# Patient Record
Sex: MALE | Race: WHITE | Employment: FULL TIME | URBAN - METROPOLITAN AREA
[De-identification: names, ages, dates, MRNs, and addresses within clinical notes are randomized per-mention and may not be internally consistent; named-entity substitution may affect disease eponyms.]

---
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
## 2023-01-03 ENCOUNTER — APPOINTMENT (OUTPATIENT)
Dept: PSYCHIATRY | Age: 18
End: 2023-01-03
Payer: COMMERCIAL

## 2023-01-03 ENCOUNTER — HOSPITAL ENCOUNTER (OUTPATIENT)
Dept: PSYCHIATRY | Age: 18
Setting detail: THERAPIES SERIES
Discharge: HOME | End: 2023-01-03
Payer: COMMERCIAL

## 2023-01-04 ENCOUNTER — APPOINTMENT (OUTPATIENT)
Dept: PSYCHIATRY | Age: 18
End: 2023-01-04
Payer: COMMERCIAL

## 2023-01-04 ENCOUNTER — HOSPITAL ENCOUNTER (OUTPATIENT)
Dept: PSYCHIATRY | Age: 18
Setting detail: THERAPIES SERIES
Discharge: HOME | End: 2023-01-04
Payer: COMMERCIAL

## 2023-01-04 PROCEDURE — 90834 PSYTX W PT 45 MINUTES: CPT

## 2023-01-04 NOTE — PROGRESS NOTES
[facility] TREATMENT PLAN      Location: [x] [place] [] Eyal Dunn    Treatment plan: Initial    Strengths: Hard worker, caring    Weakness/Limitations: impulsive     Service/Frequency/Duration: Individual 1 time a week for 90 days, Group assigned 1 time a week for 90 days, Urinalysis Random for 90 days, and Case Management as needed for 90 days    Diagnosis: F12.10 Nondependent cannabis abuse-unspecified use    Level of Care: 1 Outpatient Services     Problem: Substance use resulting in getting a marijuana STEPHANIE      Goal: Enhance personalized knowledge and insight associated with mood altering substances in 90 days   Objectives:   1) Remind self of 3-5 detrimental consequences in major life areas regarding substance use in 90 days Evaluation Date: 12/14/2022 Code: Achieved 9/21/2022 Client reports marijuana affected work due to making him lazy, he is on probation, he spent money, and his parents were disappointed in him due to getting the [employer]. 2) Identify 4 to 8 benefits and gratitude's due to remaining substance free in 90 days: Evaluation Date: 12/14/2022 Code: Achieved 12/7/2022 I am grateful for my daughter being healthy, my fiance' and my relationship, and for our home. 3) Identify 4 relapse triggers, define relapse, difference between internal and external triggers associated with substance use in 90 days and Evaluation Date: 12/14/2022 Code: Achieved 10/19/2022 Client reports when he first stopped smoking marijuana people and frustration were his biggest triggers. Now he denies having any triggers.       2.    Problem: Low Self-Esteem/Identify issues as a result of self-medicating     Goal: Will learn to focus on character strengths and feel better about himself in 90 days  Objectives:   1) Client will journal 3-5 times weekly thoughts and feelings and share in his individual sessions in 90 days:  Evaluation Date: 1/14/2023 Code: [address] 1/4/2023   2) Client will explore 3 new outdoor/indoor activities that bring him quentin in 90 days Evaluation Date:1/14/2023 Code: [address] 1/4/2023    3)  Utilize, if needed case management services provided by [facility] to enhance abstaining from substance use in 90 days Evaluation Date: 12/14/2022 Code: Discontinue 11/16/2022        3. Problem: History of Relapse due to lack of sober support. Goal: Understand that continuing to associate with the current peer group increases the risk of relapse in 90 days         Objectives:   1)  Identify and address 4 to 8 peers he should set boundaries with to avoid substance use in 90 days Evaluation Date: 12/14/2022 Code: Achieved 11/9/2022 Client has set clear boundaries with his family about not smoking around his child/baby. He reports his friends don't come around anymore     2) Enhance 4 to 8 healthy techniques and coping skills, relapse prevention in 90 days Evaluation Date: 12/14/2022 Code: Achieved 11/30/2022 Client reports he works full time, plays video games, plays with his dogs and plays basketball. 3) Identify 5 times when peer group negativity led to addictive behavior in 90 days Evaluation Date: 12/14/2022 Code Achieved 11/9/2022 Client denies peer group influenced him at all. If he wanted to smoke weed he would. 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 Discharge Plan/Instructions: Client will remain abstinent from all mood altering substances and complete his treatment plan goals and objectives. Abraham Stokes / 2005 has participated in the treatment plan development outlined above on 1/4/2023.      MARTIN David  1/4/2023/2:20 PM

## 2023-01-04 NOTE — PROGRESS NOTES
612 [facility]        Individual  Progress Note    Location: [x] [place] [] Shayy kelly                   Patient Name: Dave Mckee   : 2005     Case # :  [id number]  Therapist: MARTIN Peters        Objective/Service/Time: kept 1 hour individual     S-Client is a 21year old  male on probation. Client reports he is using his medical marijuana daily. He shared he has not been working much and his background check came in and he is planning on starting at [address] on the . Client denies any current obstacles or stressors. O-Client was pleasant, cooperative and oriented x 4. A-Client has good insight into his AoD use and consequences. He denies medical marijuana being a problem for him and reports the benefit as worth it. He has ADD and reports he cannot focus or sit still. 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 Counselor encouraged him to communicate with his father. P-Client is being referred back to probation and encouraged to contact [facility] if he needs help in the future.          Electronically signed by Milo Torres on 2023 at 2:46 PM

## 2023-01-04 NOTE — GROUP NOTE
[facility] Group Therapy Note      1/3/2023    Location:  [place]      Clients Presents: 9012, 1329, 9513, 1304, 1324    Clients Absent: 1257, 1336, 1294, 1293    Length of session: 1.5 hours    Group Note: OP    Group Type: Co-Ed    New members were welcomed and introduced. Norms and expectations of group were discussed. Content: Counselor presented a topic focused discussion on Relationships and isolation in recovery. Client identified relationships that were important to him/her. Client reported if those relationships were moving him/her more toward recovery and if there were any changes that need to be made? Aneudy Araya, [address]  1/3/2023 7:00 PM    Co-Therapist: N/A      [facility] Individual Group Progress Note    Stephen Arndt  2005 1/4/2023    Notes on Client Progress in Group    Reason for Absence: DNS this was to be his last group?     Aneudy Araya, [address]  1/4/2023 8:15 AM    Co-Therapist: N/A

## 2023-01-04 NOTE — PROGRESS NOTES
[facility] Discharge Treatment Plan    Stephen Arndt  2005  Case # [phone] [phone]    Location: [x] [place] [] [place]. Stresses that I need to monitor  1. Dirty house  2. No work   3.    4.     B. Major triggers to using alcohol/drugs   1.  anxiety  2.    3.      Sober Plan   Medical marijuana    2.    3.     C. Sobriety Support   1. FriendRick Shaw  2. Treatment staff:  Nia Hogan  3. Family member:  mom and dad  3. AA/NA recovery program, sponsor, meetings day/times, daily ready: none     D. Non-using activities  1. Video games  2. Basketball   3. Wrestle     E. Consequences of Drug/Alcohol use  1. Probation   2. Loss of money   3. Treatment     G. Short term goals to achieve  1. Get a new house  2. Work at [employer]. Follow up recommendations  1. Client is encouraged to maintain compliance with his medical marijuana. 2. Client is encouraged to call [facility] if he needs help in the future. Stephen Arndt / 2005 has participated in the discharge treatment plan development outlined above on 1/4/2023.      TERESA DeIII  1/4/2023/2:21 PM

## 2023-01-10 ENCOUNTER — APPOINTMENT (OUTPATIENT)
Dept: PSYCHIATRY | Age: 18
End: 2023-01-10
Payer: COMMERCIAL

## 2023-01-11 ENCOUNTER — APPOINTMENT (OUTPATIENT)
Dept: PSYCHIATRY | Age: 18
End: 2023-01-11
Payer: COMMERCIAL

## 2024-07-17 ENCOUNTER — APPOINTMENT (OUTPATIENT)
Dept: GENERAL RADIOLOGY | Age: 19
End: 2024-07-17
Payer: OTHER MISCELLANEOUS

## 2024-07-17 ENCOUNTER — APPOINTMENT (OUTPATIENT)
Dept: CT IMAGING | Age: 19
End: 2024-07-17
Payer: OTHER MISCELLANEOUS

## 2024-07-17 ENCOUNTER — HOSPITAL ENCOUNTER (EMERGENCY)
Age: 19
Discharge: HOME | End: 2024-07-17
Attending: STUDENT IN AN ORGANIZED HEALTH CARE EDUCATION/TRAINING PROGRAM
Payer: OTHER MISCELLANEOUS

## 2024-07-17 VITALS
SYSTOLIC BLOOD PRESSURE: 101 MMHG | DIASTOLIC BLOOD PRESSURE: 52 MMHG | HEART RATE: 54 BPM | RESPIRATION RATE: 16 BRPM | TEMPERATURE: 97.7 F | OXYGEN SATURATION: 99 %
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
## 2024-07-17 DIAGNOSIS — S80.02XA CONTUSION OF LEFT KNEE, INITIAL ENCOUNTER: ICD-10-CM

## 2024-07-17 DIAGNOSIS — S06.0XAA CONCUSSION WITH UNKNOWN LOSS OF CONSCIOUSNESS STATUS, INITIAL ENCOUNTER: ICD-10-CM

## 2024-07-17 DIAGNOSIS — S16.1XXA STRAIN OF NECK MUSCLE, INITIAL ENCOUNTER: ICD-10-CM

## 2024-07-17 PROCEDURE — 72125 CT NECK SPINE W/O DYE: CPT

## 2024-07-17 PROCEDURE — 72128 CT CHEST SPINE W/O DYE: CPT

## 2024-07-17 PROCEDURE — 73130 X-RAY EXAM OF HAND: CPT

## 2024-07-17 PROCEDURE — 70450 CT HEAD/BRAIN W/O DYE: CPT

## 2024-07-17 PROCEDURE — 73564 X-RAY EXAM KNEE 4 OR MORE: CPT

## 2024-07-17 PROCEDURE — 6370000000 HC RX 637 (ALT 250 FOR IP): Performed by: STUDENT IN AN ORGANIZED HEALTH CARE EDUCATION/TRAINING PROGRAM

## 2024-07-17 PROCEDURE — 99284 EMERGENCY DEPT VISIT MOD MDM: CPT

## 2024-07-17 RX ORDER — ONDANSETRON 2 MG/ML
4 INJECTION INTRAMUSCULAR; INTRAVENOUS
Status: DISCONTINUED | OUTPATIENT
Start: 2024-07-17 | End: 2024-07-17 | Stop reason: HOSPADM

## 2024-07-17 RX ORDER — CYCLOBENZAPRINE HCL 10 MG
10 TABLET ORAL 3 TIMES DAILY PRN
Qty: 21 TABLET | Refills: 0 | Status: SHIPPED | OUTPATIENT
Start: 2024-07-17 | End: 2024-07-27

## 2024-07-17 RX ORDER — ACETAMINOPHEN 500 MG
1000 TABLET ORAL ONCE
Status: COMPLETED | OUTPATIENT
Start: 2024-07-17 | End: 2024-07-17

## 2024-07-17 RX ORDER — MORPHINE SULFATE 4 MG/ML
4 INJECTION, SOLUTION INTRAMUSCULAR; INTRAVENOUS ONCE
Status: DISCONTINUED | OUTPATIENT
Start: 2024-07-17 | End: 2024-07-17 | Stop reason: HOSPADM

## 2024-07-17 RX ADMIN — ACETAMINOPHEN 1000 MG: 500 TABLET ORAL at 16:57

## 2024-07-17 ASSESSMENT — PAIN - FUNCTIONAL ASSESSMENT
PAIN_FUNCTIONAL_ASSESSMENT: 0-10
PAIN_FUNCTIONAL_ASSESSMENT: 0-10

## 2024-07-17 ASSESSMENT — PAIN SCALES - GENERAL
PAINLEVEL_OUTOF10: 6
PAINLEVEL_OUTOF10: 7
PAINLEVEL_OUTOF10: 2

## 2024-07-17 ASSESSMENT — PAIN DESCRIPTION - LOCATION
LOCATION: HEAD

## 2024-07-17 ASSESSMENT — PAIN DESCRIPTION - DESCRIPTORS
DESCRIPTORS: ACHING

## 2024-07-17 ASSESSMENT — LIFESTYLE VARIABLES
HOW MANY STANDARD DRINKS CONTAINING ALCOHOL DO YOU HAVE ON A TYPICAL DAY: PATIENT DOES NOT DRINK
HOW OFTEN DO YOU HAVE A DRINK CONTAINING ALCOHOL: NEVER
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
## 2024-07-17 NOTE — DISCHARGE INSTR - COC
Continuity of Care Form    Patient Name: Rudi Alvarez   :  2005  MRN:  [id number]    Admit date:  2024  Discharge date:  ***    Code Status Order: No Order   Advance Directives:     Admitting Physician:  No admitting provider for patient encounter.  PCP: Vishnu Garcia MD    Discharging Nurse: ***  Discharging Hospital Unit/Room#: ED22/ED-22  Discharging Unit Phone Number: ***    Emergency Contact:   Extended Emergency Contact Information  Primary Emergency Contact: AntonioAngelSharlene ALEX  Address: [address] [place] [address]  Home Phone: [phone]  Mobile Phone: [phone]  Relation: Parent  Preferred language: English   needed? No    Past Surgical History:  No past surgical history on file.    Immunization History:     There is no immunization history on file for this patient.    Active Problems:  There is no problem list on file for this patient.      Isolation/Infection:   Isolation            No Isolation          Patient Infection Status       None to display            Nurse Assessment:  Last Vital Signs: BP (!) 101/52   Pulse 54   Temp 97.7 °F (36.5 °C) (Oral)   Resp 16   SpO2 99%     Last documented pain score (0-10 scale): Pain Level: 2  Last Weight:   Wt Readings from Last 1 Encounters:   22 68 kg (150 lb) (49 %, Z= -0.02)*     * Growth percentiles are based on [facility] (Boys, 2-20 Years) data.     Mental Status:  {IP PT MENTAL STATUS:}    IV Access:  { GENIE IV ACCESS:790433055}    Nursing Mobility/ADLs:  Walking   {CHP DME ADLs:253891185}  Transfer  {CHP DME ADLs:026693358}  Bathing  {CHP DME ADLs:807426027}  Dressing  {CHP DME ADLs:209932608}  Toileting  {CHP DME ADLs:274094456}  Feeding  {CHP DME ADLs:105512627}  Med Admin  {CHP DME ADLs:058868818}  Med Delivery   { GENIE MED Delivery:781148060}    Wound Care Documentation and Therapy:        Elimination:  Continence:   Bowel: {YES / NO:16705}  Bladder: {YES /

## 2024-08-27 ENCOUNTER — HOSPITAL ENCOUNTER (EMERGENCY)
Age: 19
Discharge: HOME | End: 2024-08-27
Attending: EMERGENCY MEDICINE

## 2024-08-27 ENCOUNTER — APPOINTMENT (OUTPATIENT)
Dept: GENERAL RADIOLOGY | Age: 19
End: 2024-08-27

## 2024-08-27 VITALS
DIASTOLIC BLOOD PRESSURE: 68 MMHG | SYSTOLIC BLOOD PRESSURE: 136 MMHG | WEIGHT: 155 LBS | RESPIRATION RATE: 16 BRPM | TEMPERATURE: 98.6 F | HEART RATE: 65 BPM | OXYGEN SATURATION: 98 % | BODY MASS INDEX: 22.96 KG/M2 | HEIGHT: 69 IN

## 2024-08-27 DIAGNOSIS — M79.671 RIGHT FOOT PAIN: Primary | ICD-10-CM

## 2024-08-27 PROCEDURE — 73630 X-RAY EXAM OF FOOT: CPT

## 2024-08-27 PROCEDURE — 99283 EMERGENCY DEPT VISIT LOW MDM: CPT

## 2024-08-27 ASSESSMENT — PAIN SCALES - GENERAL
PAINLEVEL_OUTOF10: 5
PAINLEVEL_OUTOF10: 5

## 2024-08-27 ASSESSMENT — PAIN DESCRIPTION - LOCATION
LOCATION: FOOT
LOCATION: FOOT

## 2024-08-27 ASSESSMENT — PAIN DESCRIPTION - ORIENTATION: ORIENTATION: RIGHT

## 2024-08-27 ASSESSMENT — PAIN - FUNCTIONAL ASSESSMENT
PAIN_FUNCTIONAL_ASSESSMENT: PREVENTS OR INTERFERES WITH MANY ACTIVE NOT PASSIVE ACTIVITIES
PAIN_FUNCTIONAL_ASSESSMENT: 0-10
PAIN_FUNCTIONAL_ASSESSMENT: 0-10

## 2024-08-27 ASSESSMENT — PAIN DESCRIPTION - DESCRIPTORS: DESCRIPTORS: ACHING;DISCOMFORT;NAGGING;SHARP

## 2024-08-28 NOTE — ED PROVIDER NOTES
Triage Chief Complaint:   Foot Injury (1.5 months, reports keeps injury it)    [place]:  Rudi Alvarez is a 19 y.o. male that presents with 2 months of right foot pain that happened after he injured it during MMA fighting.  States that pain is to the top of his foot, worse with ambulation.  Denies motor or sensory deficits or other acute injury.    ROS:  At least 4 systems reviewed and otherwise acutely negative except as in the [place].    Past Medical History:   Diagnosis Date    Asthma      History reviewed. No pertinent surgical history.  Family History   Problem Relation Age of Onset    Substance Abuse Mother     Substance Abuse Father      Social History     Socioeconomic History    Marital status: Single     Spouse name: Not on file    Number of children: Not on file    Years of education: Not on file    Highest education level: Not on file   Occupational History    Not on file   Tobacco Use    Smoking status: Never    Smokeless tobacco: Never   Substance and Sexual Activity    Alcohol use: No    Drug use: Yes     Types: Marijuana (Weed)     Comment: medical marijuana card    Sexual activity: Not on file   Other Topics Concern    Not on file   Social History Narrative    Not on file     Social Determinants of Health     Financial Resource Strain: Not on file   Food Insecurity: Food Insecurity Present (8/27/2024)    Hunger Vital Sign     Worried About Running Out of Food in the Last Year: Sometimes true     Ran Out of Food in the Last Year: Never true   Transportation Needs: Not on file   Physical Activity: Not on file   Stress: Not on file   Social Connections: Not on file   Intimate Partner Violence: Not on file   Housing Stability: Not on file     No current facility-administered medications for this encounter.     Current Outpatient Medications   Medication Sig Dispense Refill    naproxen (NAPROSYN) 375 MG tablet Take 1 tablet by mouth 2 times daily as needed for Pain 60 tablet 0     Allergies   Allergen

## 2024-10-07 ENCOUNTER — HOSPITAL ENCOUNTER (EMERGENCY)
Age: 19
Discharge: HOME | End: 2024-10-07

## 2024-10-07 VITALS
SYSTOLIC BLOOD PRESSURE: 104 MMHG | HEIGHT: 69 IN | BODY MASS INDEX: 22.96 KG/M2 | TEMPERATURE: 98.1 F | DIASTOLIC BLOOD PRESSURE: 89 MMHG | HEART RATE: 55 BPM | RESPIRATION RATE: 16 BRPM | OXYGEN SATURATION: 100 % | WEIGHT: 155 LBS

## 2024-10-07 DIAGNOSIS — L01.00 IMPETIGO: Primary | ICD-10-CM

## 2024-10-07 PROCEDURE — 99283 EMERGENCY DEPT VISIT LOW MDM: CPT

## 2024-10-07 PROCEDURE — 6370000000 HC RX 637 (ALT 250 FOR IP): Performed by: PHYSICIAN ASSISTANT

## 2024-10-07 RX ORDER — MUPIROCIN 20 MG/G
OINTMENT TOPICAL
Qty: 30 G | Refills: 0 | Status: SHIPPED | OUTPATIENT
Start: 2024-10-07 | End: 2024-10-14

## 2024-10-07 RX ORDER — CEPHALEXIN 500 MG/1
500 CAPSULE ORAL 4 TIMES DAILY
Qty: 28 CAPSULE | Refills: 0 | Status: SHIPPED | OUTPATIENT
Start: 2024-10-07 | End: 2024-10-14

## 2024-10-07 RX ORDER — MUPIROCIN 20 MG/G
OINTMENT TOPICAL ONCE
Status: COMPLETED | OUTPATIENT
Start: 2024-10-07 | End: 2024-10-07

## 2024-10-07 RX ADMIN — CEPHALEXIN 500 MG: 250 CAPSULE ORAL at 20:32

## 2024-10-07 RX ADMIN — MUPIROCIN: 20 OINTMENT TOPICAL at 20:32

## 2024-10-08 NOTE — ED PROVIDER NOTES
EMERGENCY DEPARTMENT ENCOUNTER        Pt Name: Rudi Alvarez  MRN: [id number]  Birthdate 2005  Date of evaluation: 10/7/2024  Provider: Josie Samuels PA-C  PCP: Vishnu Garcia MD    MIKIE. I have evaluated this patient.        Triage CHIEF COMPLAINT       Chief Complaint   Patient presents with    Rash     Pt presents to the ED with complaints of a rash under chin. States that it is very itchy.          HISTORY OF PRESENT ILLNESS      Chief Complaint: Rash    Rudi Alvarez is a 19 y.o. male who presents for a rash.  Onset was a few days ago.  Localized to the chin.  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        Nursing Notes were all reviewed and agreed with or any disagreements were addressed in the HPI.    REVIEW OF SYSTEMS     CONSTITUTIONAL:  Denies fever.  EYES:  Denies visual changes.  HEAD:  Denies headache.  ENT:  Denies earache, nasal congestion, sore throat.  NECK:  Denies neck pain.  RESPIRATORY:  Denies any shortness of breath.  CARDIOVASCULAR:  Denies chest pain.  GI:  Denies nausea or vomiting.    :  Denies urinary symptoms.  MUSCULOSKELETAL:  Denies extremity pain or swelling.  BACK:  Denies back pain.  INTEGUMENT:  + rash  LYMPHATIC:  Denies lymphadenopathy.  NEUROLOGIC:  Denies any numbness/tingling.  PSYCHIATRIC:  Denies SI/HI.    PAST MEDICAL HISTORY     Past Medical History:   Diagnosis Date    Asthma        SURGICAL HISTORY   History reviewed. No pertinent surgical history.    CURRENTMEDICATIONS       Discharge Medication List as of 10/7/2024  8:30 PM        CONTINUE these medications which have NOT CHANGED    Details   naproxen (NAPROSYN) 375 MG tablet Take 1 tablet by mouth 2 times daily as needed for Pain, Disp-60 tablet, R-0Normal             ALLERGIES     Vigamox [moxifloxacin]    FAMILYHISTORY       Family History   Problem Relation Age of Onset    Substance Abuse Mother

## 2024-10-15 ENCOUNTER — HOSPITAL ENCOUNTER (EMERGENCY)
Age: 19
Discharge: HOME | End: 2024-10-15
Payer: COMMERCIAL

## 2024-10-15 VITALS
SYSTOLIC BLOOD PRESSURE: 151 MMHG | TEMPERATURE: 97.6 F | HEART RATE: 74 BPM | OXYGEN SATURATION: 98 % | DIASTOLIC BLOOD PRESSURE: 87 MMHG | RESPIRATION RATE: 16 BRPM

## 2024-10-15 DIAGNOSIS — L01.00 IMPETIGO: Primary | ICD-10-CM

## 2024-10-15 PROCEDURE — 99283 EMERGENCY DEPT VISIT LOW MDM: CPT

## 2024-10-15 RX ORDER — MUPIROCIN 20 MG/G
OINTMENT TOPICAL
Qty: 30 G | Refills: 0 | Status: SHIPPED | OUTPATIENT
Start: 2024-10-15 | End: 2024-10-22

## 2024-10-15 ASSESSMENT — LIFESTYLE VARIABLES
HOW OFTEN DO YOU HAVE A DRINK CONTAINING ALCOHOL: NEVER
HOW MANY STANDARD DRINKS CONTAINING ALCOHOL DO YOU HAVE ON A TYPICAL DAY: PATIENT DOES NOT DRINK

## 2024-10-16 NOTE — ED PROVIDER NOTES
[facility] EMERGENCY DEPARTMENT  EMERGENCY DEPARTMENT ENCOUNTER        Pt Name: Rudi Alvarez  MRN: [id number]  Birthdate 2005  Date of evaluation: 10/15/2024  Provider: Efra Delgado PA-C  PCP: Vishnu Garcia MD  Note Started: 9:34 PM EDT 10/15/24      MIKIE. I have evaluated this patient.        CHIEF COMPLAINT       Chief Complaint   Patient presents with    Rash     Pt reports having impetigo to neck. Was given ATB and Bactroban but needing more ointment. Reports improvement.        HISTORY OF PRESENT ILLNESS: 1 or more Elements     Rudi Alvarez is a 19 y.o. male who presents requesting another prescription of bacitracin.  Reports interval improvement of what was previously diagnosed as impetigo, denies any new rashes, denies it is painful, denies any new detergent .     Nursing Notes were all reviewed and agreed with or any disagreements were addressed in the HPI.    Historians other than the patient: none    Limitations to history : None    Social Determinants Significantly Affecting Health : None    Records Reviewed (External and Source): Hospital encounter at the psychiatry session team from January 4, 2023    PAST MEDICAL HISTORY      has a past medical history of Asthma.     REVIEW OF SYSTEMS :      Review of Systems    Positives and Pertinent negatives as per HPI.     SURGICAL HISTORY   History reviewed. No pertinent surgical history.    CURRENTMEDICATIONS       Discharge Medication List as of 10/15/2024  6:33 PM        CONTINUE these medications which have NOT CHANGED    Details   naproxen (NAPROSYN) 375 MG tablet Take 1 tablet by mouth 2 times daily as needed for Pain, Disp-60 tablet, R-0Normal             ALLERGIES     Vigamox [moxifloxacin]    FAMILYHISTORY       Family History   Problem Relation Age of Onset    Substance Abuse Mother     Substance Abuse Father         SOCIAL HISTORY       Social History     Tobacco Use    Smoking status: Never